# Patient Record
(demographics unavailable — no encounter records)

---

## 2024-12-11 NOTE — HEALTH RISK ASSESSMENT
[No] : In the past 12 months have you used drugs other than those required for medical reasons? No [0] : 2) Feeling down, depressed, or hopeless: Not at all (0) [[id number]] : 0 [Never] : Never

## 2024-12-11 NOTE — HISTORY OF PRESENT ILLNESS
[de-identified] : 24yoM, accompanied by sister, PMH environmental allergies, eczema, intellectual disability, ADHD presents for annual exam  working part time at [employer] - 6pm -11pm shifts endorses occ chest discomfort during break at work - patient unable to fully characterize chest pain - unsure of nature, timing denies sob, dizziness, palpitations ekg in office - NSR with no ischemic changes  12.9.24 : PFT : no asthma cardiology workup unremarkble for atypical chest pain intermittent cp and sob 2/2 anxiety sees psychiatry monthly and has therapist continues to work at [employer] methylphenidate back order  poor diet, fast food, gained weight  completed flu vaccine